# Patient Record
Sex: FEMALE | URBAN - METROPOLITAN AREA
[De-identification: names, ages, dates, MRNs, and addresses within clinical notes are randomized per-mention and may not be internally consistent; named-entity substitution may affect disease eponyms.]

---

## 2022-08-22 ENCOUNTER — OFFICE VISIT (OUTPATIENT)
Dept: URBAN - METROPOLITAN AREA CLINIC 63 | Facility: CLINIC | Age: 62
End: 2022-08-22
Payer: COMMERCIAL

## 2022-08-22 DIAGNOSIS — H25.13 BILATERAL NUCLEAR SCLEROSIS CATARACT: ICD-10-CM

## 2022-08-22 DIAGNOSIS — H20.9 UVEITIS: Primary | ICD-10-CM

## 2022-08-22 PROCEDURE — 99204 OFFICE O/P NEW MOD 45 MIN: CPT | Performed by: OPTOMETRIST

## 2022-08-22 RX ORDER — PREDNISOLONE ACETATE 10 MG/ML
1 % SUSPENSION/ DROPS OPHTHALMIC
Qty: 5 | Refills: 1 | Status: ACTIVE
Start: 2022-08-22

## 2022-08-22 ASSESSMENT — INTRAOCULAR PRESSURE
OD: 18
OS: 16

## 2022-08-22 ASSESSMENT — VISUAL ACUITY
OD: 20/25
OS: 20/20

## 2022-08-22 ASSESSMENT — KERATOMETRY
OD: 45.75
OS: 45.88

## 2022-08-22 NOTE — IMPRESSION/PLAN
Impression: Bilateral nuclear sclerosis cataract: H25.13. Plan: Cataracts account for the patient's complaints. No treatment currently recommended.   The patient will monitor vision changes and will return for refraction

## 2022-08-22 NOTE — IMPRESSION/PLAN
Impression: Uveitis: H20.9 Right.  Plan: Secondary after dental procedure; pt ed on findings, Rx PF 1gtt qid OD x 1 wk with taper, shake before instillation

## 2022-09-07 NOTE — IMPRESSION/PLAN
Impression: Uveitis: H20.9 Right. Plan: Condition improved, continue Pred 1 gtt OS bid x 3 days, and then 1 gtt OS qs x 3 days. RTO if symptoms recur.